# Patient Record
Sex: FEMALE | Race: BLACK OR AFRICAN AMERICAN | Employment: UNEMPLOYED | URBAN - METROPOLITAN AREA
[De-identification: names, ages, dates, MRNs, and addresses within clinical notes are randomized per-mention and may not be internally consistent; named-entity substitution may affect disease eponyms.]

---

## 2024-08-03 ENCOUNTER — HOSPITAL ENCOUNTER (EMERGENCY)
Age: 44
Discharge: HOME | End: 2024-08-03

## 2024-08-03 VITALS
OXYGEN SATURATION: 100 % | BODY MASS INDEX: 32.2 KG/M2 | HEIGHT: 62 IN | DIASTOLIC BLOOD PRESSURE: 68 MMHG | SYSTOLIC BLOOD PRESSURE: 155 MMHG | RESPIRATION RATE: 18 BRPM | HEART RATE: 74 BPM | WEIGHT: 175 LBS | TEMPERATURE: 98.3 F

## 2024-08-03 DIAGNOSIS — J06.9 VIRAL URI WITH COUGH: Primary | ICD-10-CM

## 2024-08-03 PROCEDURE — 6370000000 HC RX 637 (ALT 250 FOR IP): Performed by: NURSE PRACTITIONER

## 2024-08-03 PROCEDURE — 99283 EMERGENCY DEPT VISIT LOW MDM: CPT

## 2024-08-03 RX ORDER — AMOXICILLIN 250 MG/5ML
500 POWDER, FOR SUSPENSION ORAL
Status: COMPLETED | OUTPATIENT
Start: 2024-08-03 | End: 2024-08-03

## 2024-08-03 RX ORDER — OXYMETAZOLINE HYDROCHLORIDE 0.05 G/100ML
2 SPRAY NASAL
Status: COMPLETED | OUTPATIENT
Start: 2024-08-03 | End: 2024-08-03

## 2024-08-03 RX ADMIN — IBUPROFEN 800 MG: 200 SUSPENSION ORAL at 21:41

## 2024-08-03 RX ADMIN — OXYMETAZOLINE HCL 2 SPRAY: 0.05 SPRAY NASAL at 21:43

## 2024-08-03 RX ADMIN — AMOXICILLIN 500 MG: 250 POWDER, FOR SUSPENSION ORAL at 21:40

## 2024-08-03 ASSESSMENT — PAIN SCALES - GENERAL
PAINLEVEL_OUTOF10: 10
PAINLEVEL_OUTOF10: 10

## 2024-08-03 ASSESSMENT — PAIN DESCRIPTION - PAIN TYPE
TYPE: ACUTE PAIN
TYPE: ACUTE PAIN

## 2024-08-03 ASSESSMENT — ENCOUNTER SYMPTOMS
ABDOMINAL PAIN: 0
NAUSEA: 0
COUGH: 1
EYES NEGATIVE: 1
BACK PAIN: 0
RHINORRHEA: 1
DIARRHEA: 0
SHORTNESS OF BREATH: 0
SORE THROAT: 1
TROUBLE SWALLOWING: 1
SINUS PRESSURE: 1

## 2024-08-03 ASSESSMENT — PAIN - FUNCTIONAL ASSESSMENT: PAIN_FUNCTIONAL_ASSESSMENT: 0-10

## 2024-08-03 ASSESSMENT — PAIN DESCRIPTION - LOCATION
LOCATION: THROAT;EAR
LOCATION: EAR;THROAT

## 2024-08-03 ASSESSMENT — LIFESTYLE VARIABLES
HOW OFTEN DO YOU HAVE A DRINK CONTAINING ALCOHOL: NEVER
HOW MANY STANDARD DRINKS CONTAINING ALCOHOL DO YOU HAVE ON A TYPICAL DAY: PATIENT DOES NOT DRINK

## 2024-08-04 NOTE — DISCHARGE INSTRUCTIONS
Start amoxicillin and take twice a day for 10 days.  Make sure to take the entire course of antibiotics.  Use ibuprofen 800 mg every 8 hours, Tylenol 1 g every 6 hours for pain control.  May additionally use Chloraseptic spray.  Should start to see an improvement in the next 48 hours.

## 2024-08-04 NOTE — ED PROVIDER NOTES
Emergency Department Provider Note       PCP: No primary care provider on file.   Age: 44 y.o.   Sex: female     DISPOSITION         ICD-10-CM    1. Viral URI with cough  J06.9           Medical Decision Making     Mildred is a 44-year-old female with history of seasonal allergies and asthma, presenting to the ED for evaluation of upper respiratory symptoms.  Physical exam shows an erythematous posterior pharynx with exudate.  She had a negative strep test yesterday.  Do not think any viral testing is needed this time.  She does have some tenderness over her facial sinuses.  I had a long discussion with her regarding viruses versus bacterial infections and the appropriate treatment for both.  Given the appearance of her throat, will start her on liquid amoxicillin.  Will give a dose of liquid ibuprofen for pain control.  Afrin nose spray for facial congestion.  Instructed her that her symptoms should improve over the next 48 hours whether from improved viral process or improvement from bacterial infection.  Patient is agreeable to this plan.  Continue taking ibuprofen and Tylenol and keep hydration up.  Strict return precautions given.  Safer discharge at this time.    Mariela Ac, FNP-C, ENP-C  10:35 PM       1 acute, uncomplicated illness or injury.  Over the counter drug management performed.  Prescription drug management performed.  Shared medical decision making was utilized in creating the patients health plan today.    I independently ordered and reviewed each unique test.                     History     HPI Mildred is a 44-year-old female with history of seasonal allergies and asthma, presenting to the ED for evaluation of upper respiratory symptoms.  She reports a 3-day history of itchy throat, sore throat, bilateral ear pain, green phlegm, facial congestion and cough.  She has been using Chloraseptic spray, Tylenol and TheraFlu and she was seen at urgent care yesterday and had negative COVID, flu